# Patient Record
Sex: FEMALE | Race: BLACK OR AFRICAN AMERICAN | Employment: UNEMPLOYED | RURAL
[De-identification: names, ages, dates, MRNs, and addresses within clinical notes are randomized per-mention and may not be internally consistent; named-entity substitution may affect disease eponyms.]

---

## 2018-03-19 ENCOUNTER — TELEPHONE (OUTPATIENT)
Dept: FAMILY MEDICINE CLINIC | Age: 48
End: 2018-03-19

## 2018-03-19 ENCOUNTER — OFFICE VISIT (OUTPATIENT)
Dept: FAMILY MEDICINE CLINIC | Age: 48
End: 2018-03-19

## 2018-03-19 VITALS
HEART RATE: 81 BPM | SYSTOLIC BLOOD PRESSURE: 168 MMHG | BODY MASS INDEX: 26.63 KG/M2 | HEIGHT: 64 IN | DIASTOLIC BLOOD PRESSURE: 97 MMHG | TEMPERATURE: 98.1 F | OXYGEN SATURATION: 98 % | WEIGHT: 156 LBS | RESPIRATION RATE: 18 BRPM

## 2018-03-19 DIAGNOSIS — Z79.899 LONG TERM CURRENT USE OF IMMUNOSUPPRESSIVE DRUG: ICD-10-CM

## 2018-03-19 DIAGNOSIS — I10 ESSENTIAL HYPERTENSION: Primary | ICD-10-CM

## 2018-03-19 DIAGNOSIS — D52.1 DRUG-INDUCED FOLATE DEFICIENCY ANEMIA: ICD-10-CM

## 2018-03-19 DIAGNOSIS — H10.13 ALLERGIC CONJUNCTIVITIS OF BOTH EYES: ICD-10-CM

## 2018-03-19 DIAGNOSIS — M06.9 RHEUMATOID ARTHRITIS INVOLVING MULTIPLE SITES, UNSPECIFIED RHEUMATOID FACTOR PRESENCE: ICD-10-CM

## 2018-03-19 RX ORDER — ATENOLOL 25 MG/1
TABLET ORAL
Refills: 3 | COMMUNITY
Start: 2018-03-12

## 2018-03-19 RX ORDER — OXYCODONE AND ACETAMINOPHEN 5; 325 MG/1; MG/1
TABLET ORAL
Refills: 0 | COMMUNITY
Start: 2018-03-15

## 2018-03-19 RX ORDER — CITALOPRAM 20 MG/1
TABLET, FILM COATED ORAL
Refills: 3 | COMMUNITY
Start: 2018-02-13

## 2018-03-19 RX ORDER — LORATADINE 10 MG/1
10 TABLET ORAL DAILY
Qty: 30 TAB | Refills: 2 | Status: SHIPPED | OUTPATIENT
Start: 2018-03-19 | End: 2018-03-19

## 2018-03-19 NOTE — TELEPHONE ENCOUNTER
Pt called and stated that she needs her prescription sent to [facility] in [place] instead of [address] [place] [address].

## 2018-03-19 NOTE — PATIENT INSTRUCTIONS

## 2018-03-19 NOTE — PROGRESS NOTES
Reviewed record in preparation for visit and have necessary documentation  Pt did not bring medication to office visit for review    Goals that were addressed and/or need to be completed during or after this appointment include   Health Maintenance Due   Topic Date Due    Pneumococcal 19-64 Medium Risk (1 of 1 - PPSV23) 12/01/1990    DTaP/Tdap/Td series (1 - Tdap) 12/01/1992    PAP AKA CERVICAL CYTOLOGY  01/28/2016    Influenza Age 9 to Adult  08/01/2017

## 2018-03-19 NOTE — TELEPHONE ENCOUNTER
Call placed to [facility] to switch prescription over to them. They stated they already have the same prescription on file for patient with same instructions with refills.  Will fill from that prescription

## 2018-03-19 NOTE — PROGRESS NOTES
Mariel Steele  56 y.o. female  1970  [address]  [id number]     [id number]: Progress Note       Encounter Date: 3/19/2018    Chief Complaint   Patient presents with    Medication Refill       History provided by patient  History of Present Illness   Mariel Steele is a 56 y.o. female who presents to clinic today for:    Patient had been going to see Dr. Shankar Dexter in the past year however her insurance recently. He had prescribing the Enbrel for her. Mild anemia  Patient present with cc of anemia. Patient has a history of RA and she is taking immunopressants. Patient reports that she also had seen 2 different doctors and recommended a hysterectomy for a fibroids and her heavy menstrual cycles. She does not recall the name of either doctor that she had seen. Hypertension: Uncontrolled   BP Readings from Last 3 Encounters:   03/19/18 (!) 168/97   02/16/16 (!) 167/108   01/12/16 (!) 146/101     The patient reports:  taking medications as instructed, no medication side effects noted, no TIA's, no chest pain on exertion, no dyspnea on exertion, no swelling of ankles, Patient has been non-compliant with medications in the past. She does not have home monitoring and due to cost she is not able to afford the medication at this time.  Though she insists that her BP is only ever high when she comes to doctors (which is the only occasions it is checked.)  Sodium   Date Value Ref Range Status   01/12/2016 139 134 - 144 mmol/L Final     Potassium   Date Value Ref Range Status   01/12/2016 4.5 3.5 - 5.2 mmol/L Final     Magnesium   Date Value Ref Range Status   10/05/2009 1.8 1.6 - 2.4 MG/DL Final     Chloride   Date Value Ref Range Status   01/12/2016 100 97 - 108 mmol/L Final     Creatinine   Date Value Ref Range Status   01/12/2016 0.91 0.57 - 1.00 mg/dL Final   09/04/2015 0.79 0.57 - 1.00 mg/dL Final   07/07/2015 0.82 0.57 - 1.00 mg/dL Final     GFR est AA   Date Value Ref Range Status   01/12/2016 89 >59 mL/min/1.73 Final   09/04/2015 106 >59 mL/min/1.73 Final   07/07/2015 101 >59 mL/min/1.73 Final     GFR est non-AA   Date Value Ref Range Status   01/12/2016 77 >59 mL/min/1.73 Final   09/04/2015 92 >59 mL/min/1.73 Final   07/07/2015 88 >59 mL/min/1.73 Final       Our goal is to normalize the blood pressure to decrease the risks of strokes and heart attacks. The patient is in agreement with the plan. Health Maintenance  Patient will call with names of providers for records. Health Maintenance Due   Topic Date Due    Pneumococcal 19-64 Medium Risk (1 of 1 - PPSV23) 12/01/1990    DTaP/Tdap/Td series (1 - Tdap) 12/01/1992    PAP AKA CERVICAL CYTOLOGY  01/28/2016    Influenza Age 9 to Adult  08/01/2017     Review of Systems   Review of Systems   Constitutional: Negative for chills and fever. HENT: Positive for congestion. Negative for ear discharge, ear pain, sinus pain and sore throat. Eyes: Positive for discharge and redness. Negative for pain. Respiratory: Negative for cough. Cardiovascular: Negative for chest pain. Skin: Negative for itching and rash. Vitals/Objective:     Vitals:    03/19/18 0807   BP: (!) 168/97   Pulse: 81   Resp: 18   Temp: 98.1 °F (36.7 °C)   TempSrc: Oral   SpO2: 98%   Weight: 156 lb (70.8 kg)   Height: 5' 4\" (1.626 m)     Body mass index is 26.78 kg/(m^2). Wt Readings from Last 3 Encounters:   03/19/18 156 lb (70.8 kg)   02/16/16 157 lb (71.2 kg)   01/12/16 161 lb (73 kg)       Physical Exam   Constitutional: She appears well-developed and well-nourished. HENT:   Head: Normocephalic and atraumatic. Mouth/Throat: No oropharyngeal exudate. Eyes: Conjunctivae are normal. Pupils are equal, round, and reactive to light. Right eye exhibits no discharge. Cardiovascular: Normal rate and regular rhythm. No murmur heard. Pulmonary/Chest: Effort normal. No respiratory distress. She has no wheezes. Musculoskeletal: She exhibits no edema or tenderness. Neurological: She is alert. Skin: Skin is warm and dry. No results found for this or any previous visit (from the past 24 hour(s)). Assessment and Plan:     Encounter Diagnoses     ICD-10-CM ICD-9-CM   1. Essential hypertension I10 401.9   2. Drug-induced folate deficiency anemia D52.1 281.2     E980.5   3. Rheumatoid arthritis involving multiple sites, unspecified rheumatoid factor presence ([facility]) M06.9 714.0   4. Long term current use of immunosuppressive drug Z79.899 V58.69   5. Allergic conjunctivitis of both eyes H10.13 372.14       1. Essential hypertension  Uncontrolled. Will have patient RTC in 1 month for recheck. She reports that she may have money for cuff by next month. 2. Drug-induced folate deficiency anemia  Patient to schedule pap smears. - norethindrone-ethinyl estradiol (ORTHO-NOVUM 1-35 TAB, NORTREL 1-35 TAB) 1-35 mg-mcg tab; Take 1 Tab by mouth daily. Dispense: 28 Tab; Refill: 12    3. Rheumatoid arthritis involving multiple sites, unspecified rheumatoid factor presence ([facility]  4. Long term current use of immunosuppressive drug  Request sent for records. - REFERRAL TO PAIN MANAGEMENT  - CBC WITH AUTOMATED DIFF  - METABOLIC PANEL, COMPREHENSIVE    5. Allergic conjunctivitis of both eyes  Advised patient regarding OTC allergy medication. She states neither claritin or allergra work but she cannot remember the name of which OTC did work for her. Also suggested flonase or nasaonex. I have discussed the diagnosis with the patient and the intended plan as seen in the above orders. she has expressed understanding. The patient has received an after-visit summary and questions were answered concerning future plans. I have discussed medication side effects and warnings with the patient as well.     Electronically Signed: Shruti Ingram MD     History/Allergies   Patients past medical, surgical and family histories were reviewed and updated. Past Medical History:   Diagnosis Date    Allergic rhinitis     Asthma 4/1/2011    Chronic pain 4/1/2011    Depression 4/19/2012    HTN (hypertension) 6/1/2010    LGSIL (LOW GRADE SQUAMOUS INTRAEPITHELIAL DYSPLASIA)     [facility])     RA (rheumatoid arthritis) ([facility] [facility]     History reviewed. No pertinent surgical history. Family History   Problem Relation Age of Onset    Elevated Lipids Mother     Migraines Mother     Migraines Sister      Social History     Social History    Marital status: SINGLE     Spouse name: N/A    Number of children: N/A    Years of education: N/A     Occupational History    Not on file. Social History Main Topics    Smoking status: Never Smoker    Smokeless tobacco: Never Used    Alcohol use No    Drug use: No    Sexual activity: Yes     Partners: Male     Other Topics Concern    Not on file     Social History Narrative         Allergies   Allergen Reactions    Neurontin [Gabapentin] Other (comments)     seizures    Prozac [Fluoxetine] Myalgia       Disposition     Follow-up Disposition:  Return in about 4 weeks (around 4/16/2018) for Blood pressure. And PAP smear. .    No future appointments. Current Medications after this visit     Current Outpatient Prescriptions   Medication Sig    atenolol (TENORMIN) 25 mg tablet TAKE ONE TABLET BY MOUTH once daily AS DIRECTED    oxyCODONE-acetaminophen (PERCOCET) 5-325 mg per tablet TAKE ONE TABLET BY MOUTH TWICE DAILY AS NEEDED. DO not TAKE WITH alprazolam    citalopram (CELEXA) 20 mg tablet TAKE ONE TABLET BY MOUTH once daily    norethindrone-ethinyl estradiol (ORTHO-NOVUM 1-35 TAB, NORTREL 1-35 TAB) 1-35 mg-mcg tab Take 1 Tab by mouth daily.  diclofenac (VOLTAREN) 1 % gel Apply 4 g to affected area four (4) times daily.     albuterol (PROVENTIL HFA, VENTOLIN HFA, PROAIR HFA) 90 mcg/actuation inhaler Take 1 Puff by inhalation every four (4) hours as needed for Wheezing. Indications: BRONCHOSPASM PREVENTION    etanercept (ENBREL) 50 mg/mL (0.98 mL) injection 1 mL by SubCUTAneous route every seven (7) days.  leflunomide (ARAVA) 20 mg tablet Take 1 Tab by mouth daily. No current facility-administered medications for this visit.       Medications Discontinued During This Encounter   Medication Reason    olopatadine (PATANOL) 0.1 % ophthalmic solution Not A Current Medication    albuterol (ACCUNEB) 0.63 mg/3 mL nebulizer solution Not A Current Medication    metroNIDAZOLE (METROGEL) 0.75 % topical gel Not A Current Medication    lisinopril (PRINIVIL, ZESTRIL) 10 mg tablet Not A Current Medication    HYDROcodone-acetaminophen (NORCO)  mg tablet Not A Current Medication    amLODIPine (NORVASC) 5 mg tablet Not A Current Medication    norethindrone-ethinyl estradiol (ORTHO-NOVUM 1-35 TAB, NORTREL 1-35 TAB) 1-35 mg-mcg tab Reorder    loratadine (CLARITIN) 10 mg tablet Clinically Ineffective

## 2018-03-19 NOTE — MR AVS SNAPSHOT
Sharlene Giles 
 
 
 [address] [address] 
[phone] Patient: Alysa Garcia MRN: [id number] :1970 Visit Information Date & Time Provider Department Dept. Phone Encounter #  
 3/19/2018  8:00 AM Silvia Snow MD [address]to [id number] Follow-up Instructions Return in about 4 weeks (around 2018) for Blood pressure. And PAP smear. Jessa Yonathan Upcoming Health Maintenance Date Due Pneumococcal 19-64 Medium Risk (1 of 1 - PPSV23) 1990 DTaP/Tdap/Td series (1 - Tdap) 1992 PAP AKA CERVICAL CYTOLOGY 2016 Influenza Age 5 to Adult 2017 Allergies as of 3/19/2018  Review Complete On: 3/19/2018 By: Ezequiel Cesar Severity Noted Reaction Type Reactions Neurontin [Gabapentin]  2010    Other (comments)  
 seizures Prozac [Fluoxetine]  10/18/2010    Myalgia Current Immunizations  Reviewed on 2014 Name Date Influenza Vaccine 2015, 2014, 10/30/2013 Influenza Vaccine Split 2010 TB Skin Test (PPD) Intradermal 7/15/2013 Not reviewed this visit You Were Diagnosed With   
  
 Codes Comments Drug-induced folate deficiency anemia    -  Primary ICD-10-CM: D52.1 ICD-9-CM: 281.2, E980.5 Essential hypertension     ICD-10-CM: I10 
ICD-9-CM: 401.9 Rheumatoid arthritis involving multiple sites, unspecified [facility] factor [facility]     ICD-10-CM: M06.9 ICD-9-CM: 714.0 Long term current use of immunosuppressive drug     ICD-10-CM: Z79.899 ICD-9-CM: V58.69 Allergic conjunctivitis of both eyes     ICD-10-CM: H10.13 ICD-9-CM: 372.14 Vitals BP Pulse Temp Resp Height(growth percentile) Weight(growth percentile) (!) 168/97 (BP 1 Location: Right arm, BP Patient Position: Sitting) 81 98.1 °F (36.7 °C) (Oral) 18 5' 4\" (1.626 m) 156 lb (70.8 kg) SpO2 BMI OB Status Smoking Status 98% 26.78 kg/m2 Chemically Induced Never Smoker Vitals History BMI and BSA Data Body Mass Index Body Surface Area  
 26.78 kg/m 2 1.79 m 2 Preferred Pharmacy Pharmacy Name Phone [address] [facility] [place] [facility] [phone] Your Updated Medication List  
  
   
This list is accurate as of 3/19/18  8:27 AM.  Always use your most recent med list.  
  
  
  
  
 albuterol 90 mcg/actuation inhaler Commonly known as:  PROVENTIL HFA, VENTOLIN HFA, PROAIR HFA Take 1 Puff by inhalation every four (4) hours as needed for Wheezing. Indications: BRONCHOSPASM PREVENTION  
  
 atenolol 25 mg tablet Commonly known as:  TENORMIN  
TAKE ONE TABLET BY MOUTH once daily AS DIRECTED  
  
 citalopram 20 mg tablet Commonly known as:  CELEXA  
TAKE ONE TABLET BY MOUTH once daily  
  
 diclofenac 1 % Gel Commonly known as:  VOLTAREN Apply 4 g to affected area four (4) times daily. etanercept 50 mg/mL (0.98 mL) injection Commonly known as:  ENBREL  
1 mL by SubCUTAneous route every seven (7) days. leflunomide 20 mg tablet Commonly known as:  [address] Take 1 Tab by mouth daily. norethindrone-ethinyl estradiol 1-35 mg-mcg Tab Commonly known as:  ORTHO-NOVUM 1-35 TAB, NORTREL 1-35 TAB Take 1 Tab by mouth daily. oxyCODONE-acetaminophen 5-325 mg per tablet Commonly known as:  PERCOCET TAKE ONE TABLET BY MOUTH TWICE DAILY AS NEEDED. DO not TAKE WITH alprazolam  
  
  
  
  
Prescriptions Sent to Pharmacy Refills  
 norethindrone-ethinyl estradiol (ORTHO-NOVUM 1-35 TAB, NORTREL 1-35 TAB) 1-35 mg-mcg tab 12 Sig: Take 1 Tab by mouth daily. Class: Normal  
 Pharmacy: [address] [facility] #: [phone] Route: Oral  
  
We Performed the Following CBC WITH AUTOMATED DIFF [72371 CPT(R)] METABOLIC PANEL, COMPREHENSIVE [72760 CPT(R)] REFERRAL TO PAIN MANAGEMENT [FXX132 Custom] Comments:  
 In [place] patient is uncertain which. Follow-up Instructions Return in about 4 weeks (around 4/16/2018) for Blood pressure. And PAP smear. .  
  
  
Referral Information Referral ID Referred By Referred To  
  
 [id number] [facility] Not Available Visits Status Start Date End Date 1 New Request 3/19/18 3/19/19 If your referral has a status of pending review or denied, additional information will be sent to support the outcome of this decision. Patient Instructions DASH Diet: Care Instructions Your Care Instructions The DASH diet is an eating plan that can help lower your blood pressure. DASH stands for Dietary Approaches to Stop Hypertension. Hypertension is high blood pressure. The DASH diet focuses on eating foods that are high in calcium, potassium, and magnesium. These nutrients can lower blood pressure. The foods that are highest in these nutrients are fruits, vegetables, low-fat dairy products, nuts, seeds, and legumes. But taking calcium, potassium, and magnesium supplements instead of eating foods that are high in those nutrients does not have the same effect. The DASH diet also includes whole grains, fish, and poultry. The DASH diet is one of several lifestyle changes your doctor may recommend to lower your high blood pressure. Your doctor may also want you to decrease the amount of sodium in your diet. Lowering sodium while following the DASH diet can lower blood pressure even further than just the DASH diet alone. Follow-up care is a key part of your treatment and safety. Be sure to make and go to all appointments, and call your doctor if you are having problems. It's also a good idea to know your test results and keep a list of the medicines you take. How can you care for yourself at home? Following the DASH diet · Eat 4 to 5 servings of fruit each day. A serving is 1 medium-sized piece of fruit, ½ cup chopped or canned fruit, 1/4 cup dried fruit, or 4 ounces (½ cup) of fruit juice. Choose fruit more often than fruit juice. · Eat 4 to 5 servings of vegetables each day. A serving is 1 cup of lettuce or raw leafy vegetables, ½ cup of chopped or cooked vegetables, or 4 ounces (½ cup) of vegetable juice. Choose vegetables more often than vegetable juice. · Get 2 to 3 servings of low-fat and fat-free dairy each day. A serving is 8 ounces of milk, 1 cup of yogurt, or 1 ½ ounces of cheese. · Eat 6 to 8 servings of grains each day. A serving is 1 slice of bread, 1 ounce of dry cereal, or ½ cup of cooked rice, pasta, or cooked cereal. Try to choose whole-grain products as much as possible. · Limit lean meat, poultry, and fish to 2 servings each day. A serving is 3 ounces, about the size of a deck of cards. · Eat 4 to 5 servings of nuts, seeds, and legumes (cooked dried beans, lentils, and split peas) each week. A serving is 1/3 cup of nuts, 2 tablespoons of seeds, or ½ cup of cooked beans or peas. · Limit fats and oils to 2 to 3 servings each day. A serving is 1 teaspoon of vegetable oil or 2 tablespoons of salad dressing. · Limit sweets and added sugars to 5 servings or less a week. A serving is 1 tablespoon jelly or jam, ½ cup sorbet, or 1 cup of lemonade. · Eat less than 2,300 milligrams (mg) of sodium a day. If you limit your sodium to 1,500 mg a day, you can lower your blood pressure even more. Tips for success · Start small. Do not try to make dramatic changes to your diet all at once. You might feel that you are missing out on your favorite foods and then be more likely to not follow the plan. Make small changes, and stick with them. Once those changes become habit, add a few more changes. · Try some of the following: ¨ Make it a goal to eat a fruit or vegetable at every meal and at snacks. This will make it easy to get the recommended amount of fruits and vegetables each day. ¨ Try yogurt topped with fruit and nuts for a snack or healthy dessert. ¨ Add lettuce, tomato, cucumber, and onion to sandwiches. ¨ Combine a ready-made pizza crust with low-fat mozzarella cheese and lots of vegetable toppings. Try using tomatoes, squash, spinach, broccoli, carrots, cauliflower, and onions. ¨ Have a variety of cut-up vegetables with a low-fat dip as an appetizer instead of chips and dip. ¨ Sprinkle sunflower seeds or chopped almonds over salads. Or try adding chopped walnuts or almonds to cooked vegetables. ¨ Try some vegetarian meals using beans and peas. Add garbanzo or kidney beans to salads. Make burritos and tacos with mashed khan beans or black beans. Where can you learn more? Go to http://desean-anthony.info/. Enter H607 in the search box to learn more about \"DASH Diet: Care Instructions. \" Current as of: September 21, 2016 Content Version: 11.4 © 9969-2040 [employer]. Care instructions adapted under license by [employer] (which disclaims liability or warranty for this information). If you have questions about a medical condition or this instruction, always ask your healthcare professional. [place] [address] any warranty or liability for your use of this information. Introducing [facility]! [facility] introduces [employer] patient portal. Now you can access parts of your medical record, email your doctor's office, and request medication refills online. 1. In your internet browser, go to [email or web address] 2. Click on the First Time User? Click Here link in the Sign In box. You will see the New Member Sign Up page. 3. Enter your [employer] Access Code exactly as it appears below. You will not need to use this code after youve completed the sign-up process.  If you do not sign up before the expiration date, you must request a new code. · [employer] Access Code: [email or web address] Expires: 6/17/2018  8:13 AM 
 
4. Enter the last four digits of your Social Security Number (xxxx) and Date of Birth (mm/dd/yyyy) as indicated and click Submit. You will be taken to the next sign-up page. 5. Create a [employer] ID. This will be your [employer] login ID and cannot be changed, so think of one that is secure and easy to remember. 6. Create a [employer] password. You can change your password at any time. 7. Enter your Password Reset Question and Answer. This can be used at a later time if you forget your password. 8. Enter your e-mail address. You will receive e-mail notification when new information is available in [address] 9. Click Sign Up. You can now view and download portions of your medical record. 10. Click the Download Summary menu link to download a portable copy of your medical information. If you have questions, please visit the Frequently Asked Questions section of the [employer] website. Remember, [employer] is NOT to be used for urgent needs. For medical emergencies, dial 911. Now available from your iPhone and Android! Please provide this summary of care documentation to your next provider. Your primary care clinician is listed as PROVIDER UNKNOWN. If you have any questions after today's visit, please call [phone].